# Patient Record
Sex: MALE | Race: WHITE | NOT HISPANIC OR LATINO | URBAN - METROPOLITAN AREA
[De-identification: names, ages, dates, MRNs, and addresses within clinical notes are randomized per-mention and may not be internally consistent; named-entity substitution may affect disease eponyms.]

---

## 2020-10-08 ENCOUNTER — EMERGENCY (EMERGENCY)
Facility: HOSPITAL | Age: 60
LOS: 1 days | Discharge: HOME | End: 2020-10-08
Attending: EMERGENCY MEDICINE | Admitting: EMERGENCY MEDICINE
Payer: COMMERCIAL

## 2020-10-08 VITALS
HEART RATE: 61 BPM | RESPIRATION RATE: 17 BRPM | TEMPERATURE: 98 F | SYSTOLIC BLOOD PRESSURE: 149 MMHG | DIASTOLIC BLOOD PRESSURE: 82 MMHG | OXYGEN SATURATION: 98 %

## 2020-10-08 VITALS
OXYGEN SATURATION: 97 % | TEMPERATURE: 98 F | WEIGHT: 179.9 LBS | SYSTOLIC BLOOD PRESSURE: 124 MMHG | RESPIRATION RATE: 17 BRPM | HEART RATE: 80 BPM | HEIGHT: 68 IN | DIASTOLIC BLOOD PRESSURE: 82 MMHG

## 2020-10-08 LAB — COHGB MFR BLDV: 2.6 % — HIGH

## 2020-10-08 PROCEDURE — 99284 EMERGENCY DEPT VISIT MOD MDM: CPT

## 2020-10-08 NOTE — ED PROVIDER NOTE - PHYSICAL EXAMINATION
Physical Exam  GEN: Awake, alert, non-toxic appearing, NCAT  EYES: full EOMI,  ENT: External inspection normal, normal voice,   HEAD: atraumatic  NECK: FROM neck, supple, no meningismus, trachea midline, no JVD  CV: rrr,   RESP: cta bl, no tachypnea, no hypoxia, no resp distress,  GI: +BS, Soft, nontender, no guarding/rebound,   MSK: FROM all 4 extremities,   SKIN: Color normal for race, warm and dry, no rash  NEURO: Oriented x3, CN 2-12 grossly intact, normal motor, strength equal bl, david x 4, steady gait, clear speech, Physical Exam  GEN: Awake, alert, non-toxic appearing, NCAT  EYES: full EOMI,  ENT: External inspection normal, normal voice, no soot, no pharyngeal edema  HEAD: atraumatic  NECK: FROM neck, supple, no meningismus, trachea midline, no JVD  CV: rrr,   RESP: cta bl, no tachypnea, no hypoxia, no resp distress,  GI: +BS, Soft, nontender, no guarding/rebound,   MSK: FROM all 4 extremities,   SKIN: Color normal for race, warm and dry, no rash  NEURO: Oriented x3, CN 2-12 grossly intact, normal motor, strength equal bl, david x 4, steady gait, clear speech,

## 2020-10-08 NOTE — ED ADULT NURSE NOTE - OBJECTIVE STATEMENT
Pt presents to ED c/o dizziness and headache s/p carbon monoxide alarm going off in apartment. Pt thinks he may have been exposed from home stove. Pt denies any CP/SOB, no N/V/D, no fever/chills.

## 2020-10-08 NOTE — ED PROVIDER NOTE - CLINICAL SUMMARY MEDICAL DECISION MAKING FREE TEXT BOX
potential co exposure, HA and LH but nonfocal neuro exam, no cp/sob, nontoxic appearing, will check carboxyhemoglobin, NRB O2, reassess

## 2020-10-08 NOTE — ED PROVIDER NOTE - OBJECTIVE STATEMENT
60 yom pw possible co exposure.  pt states he realized the stove was on, possibly for a "couple of hours".  c/o LH and HA.  no cp/sob/blurry vision/unsteadiness/abd pain/nv.  denies any sx prior. 60 yom pw possible co exposure.  pt states he realized the stove was on, possibly for a "couple of hours".  c/o LH and HA.  no cp/sob/blurry vision/unsteadiness/abd pain/nv.  denies any sx prior.  no smoke inhalation.

## 2020-10-08 NOTE — ED ADULT NURSE NOTE - CHPI ED NUR SYMPTOMS NEG
no confusion/no blurred vision/no loss of consciousness/no weakness/no change in level of consciousness/no numbness/no vomiting/no fever/no nausea

## 2020-10-08 NOTE — ED PROVIDER NOTE - PATIENT PORTAL LINK FT
You can access the FollowMyHealth Patient Portal offered by [facility] by registering at the following website: [email or web address]. By joining [employer]’s FollowMyHealth portal, you will also be able to view your health information using other applications (apps) compatible with our system.

## 2020-10-08 NOTE — ED ADULT TRIAGE NOTE - CHIEF COMPLAINT QUOTE
Pt reports carbon monoxide exposure from stove, home detector alarmed this evening. Pt reports feeling lightheaded and dizziness.

## 2020-10-08 NOTE — ED PROVIDER NOTE - NSFOLLOWUPINSTRUCTIONS_ED_ALL_ED_FT
Follow up with your primary care doctor   Return immediately for any new or worsening symptoms or any new concerns

## 2020-10-11 DIAGNOSIS — R51.9 HEADACHE, UNSPECIFIED: ICD-10-CM

## 2020-10-11 DIAGNOSIS — T58.11XA TOXIC EFFECT OF CARBON MONOXIDE FROM UTILITY GAS, ACCIDENTAL (UNINTENTIONAL), INITIAL ENCOUNTER: ICD-10-CM

## 2020-10-11 DIAGNOSIS — R42 DIZZINESS AND GIDDINESS: ICD-10-CM

## 2021-07-13 PROBLEM — Z00.00 ENCOUNTER FOR PREVENTIVE HEALTH EXAMINATION: Status: ACTIVE | Noted: 2021-07-13

## 2021-08-03 ENCOUNTER — APPOINTMENT (OUTPATIENT)
Dept: ORTHOPEDIC SURGERY | Facility: CLINIC | Age: 61
End: 2021-08-03
Payer: COMMERCIAL

## 2021-08-03 VITALS — BODY MASS INDEX: 24.44 KG/M2 | WEIGHT: 165 LBS | HEIGHT: 69 IN

## 2021-08-03 DIAGNOSIS — Z60.2 PROBLEMS RELATED TO LIVING ALONE: ICD-10-CM

## 2021-08-03 DIAGNOSIS — Z78.9 OTHER SPECIFIED HEALTH STATUS: ICD-10-CM

## 2021-08-03 DIAGNOSIS — Z86.39 PERSONAL HISTORY OF OTHER ENDOCRINE, NUTRITIONAL AND METABOLIC DISEASE: ICD-10-CM

## 2021-08-03 DIAGNOSIS — Z85.828 PERSONAL HISTORY OF OTHER MALIGNANT NEOPLASM OF SKIN: ICD-10-CM

## 2021-08-03 DIAGNOSIS — Z87.09 PERSONAL HISTORY OF OTHER DISEASES OF THE RESPIRATORY SYSTEM: ICD-10-CM

## 2021-08-03 PROCEDURE — 73030 X-RAY EXAM OF SHOULDER: CPT | Mod: RT

## 2021-08-03 PROCEDURE — 73070 X-RAY EXAM OF ELBOW: CPT | Mod: RT

## 2021-08-03 PROCEDURE — 99204 OFFICE O/P NEW MOD 45 MIN: CPT

## 2021-08-03 RX ORDER — CELECOXIB 200 MG/1
200 CAPSULE ORAL
Qty: 20 | Refills: 0 | Status: ACTIVE | COMMUNITY
Start: 2021-08-03 | End: 1900-01-01

## 2021-08-03 RX ORDER — OMEPRAZOLE 20 MG/1
20 CAPSULE, DELAYED RELEASE ORAL
Qty: 30 | Refills: 0 | Status: ACTIVE | COMMUNITY
Start: 2021-05-25

## 2021-08-03 SDOH — SOCIAL STABILITY - SOCIAL INSECURITY: PROBLEMS RELATED TO LIVING ALONE: Z60.2

## 2021-08-03 NOTE — PHYSICAL EXAM
[UE] : Sensory: Intact in bilateral upper extremities [Rad] : radial 2+ and symmetric bilaterally [Normal RUE] : Right Upper Extremity: No scars, rashes, lesions, ulcers, skin intact [Normal LUE] : Left Upper Extremity: No scars, rashes, lesions, ulcers, skin intact [Normal Touch] : sensation intact for touch [Normal] : Oriented to person, place, and time, insight and judgement were intact and the affect was normal [Pain Left Shoulder Active Forw Flexion Against Resistance] : positive Empty Can test [Shoulder Pain During Carlson-Baldo Impingement Test Left] : positive Carlson test [Shoulder Pain Elicited During Neer Impingement Test Left] : positive Neer test [Shoulder Pain Elicited During [place]'s Test Left] : positive Corbett's test [Tender On Palpation Bicipital Groove Speed's Test Left] : positive Speed's test [Elbow Instability Laxity Left Medial] : negative Valgus Stress test [Elbow Instability Laxity Left Lateral] : negative Varus stress test [Shoulder Instability] : negative Anterior-Posterior Slide [Shoulder Instab Anterior Apprehension (Crank) Test Left] : negative Crank test [Shoulder Muscle Weakness Supraspinatus Left Only] : negative Drop Arm test [Active Abduction Left Shoulder Decreased] : negative Painful Arc [FreeTextEntry2] : Shoulder range of motion is with 170 to 175 degrees elevation without any significant pain. Internal rotation to T9. External rotation to about 75 degrees. There is about 5 degrees less motion on the right than left shoulder. No significant pain with range of motion.\par Mild pain with Neer and Carlson but no severe pain. Mild pain with Braxton's.\par No tenderness around the shoulder.\par No scapular winging. No deformity. [de-identified] : \par X-rays of the right shoulder taken today AP, Y lateral and axillary views are unremarkable. There may be mild AC joint narrowing. No evidence of arthritis and no calcifications. Glenohumeral joint is aligned normally.\par \par X-rays of the right elbow AP and lateral views are unremarkable

## 2021-08-03 NOTE — HISTORY OF PRESENT ILLNESS
[de-identified] : Mr Brennan is a 60 yo RHD  who presents for pain in his right shoulder and elbow. \par Shoulder pain started several years ago. He had done some therapy a few years ago. He feels like there is limited mobility in his shoulder on the right compared to the left that affects his tennis particularly overhead activity like serving. Several weeks ago he developed some elbow pain. He thought it might be related to the way he is straining the elbow and is served but then also had hit a wall with the palm of his hand in the wall was harder than what he thought and this caused some elbow pain.\par He does not take any medication for his pains.

## 2021-08-03 NOTE — REVIEW OF SYSTEMS
[Joint Pain] : joint pain [Joint Stiffness] : joint stiffness [Negative] : Heme/Lymph [Heartburn] : heartburn [Urinary Frequency] : urinary frequency

## 2021-08-03 NOTE — ASSESSMENT
[FreeTextEntry1] : 61-year-old gentleman right-hand-dominant  who comes in with some chronic right elbow pain and mild stiffness and more recent right elbow pain.\par On exam his shoulder is some mild loss of motion and there is some evidence of rotator cuff tendinopathy. Elbow exam is most consistent with lateral epicondylitis although there is also some mild medial sided tenderness. No evidence of any fractures given his recent trauma hitting and hard object.\par For both I recommended first starting with relative rest backing off on the tennis to avoid aggravating the condition and to start physical therapy. He was given home exercises as well. Heat and ice. For a few weeks he will take 1 Celebrex a day as an anti-inflammatory to see if this helps.\par He should follow-up in about 8 weeks. If its not getting better then we may need to work-up further with other imaging.\par If he is getting better than it would be good to get on a long-term strengthening and conditioning program to help him play tennis continually without with less risk of injury.

## 2021-08-03 NOTE — REASON FOR VISIT
[Initial Visit] : an initial visit for [Shoulder Pain] : shoulder pain [FreeTextEntry2] : elbow pain

## 2021-11-17 ENCOUNTER — APPOINTMENT (OUTPATIENT)
Dept: ORTHOPEDIC SURGERY | Facility: CLINIC | Age: 61
End: 2021-11-17

## 2022-02-03 NOTE — HISTORY OF PRESENT ILLNESS
[de-identified] : Mr Brennan is a 62 yo RHD  comes in for f/u for pain in his right shoulder and elbow. He was seen 6 mos ago.\par Shoulder pain

## 2022-02-03 NOTE — ASSESSMENT
[FreeTextEntry1] : 61-year-old gentleman right-hand-dominant  who comes in with some chronic right elbow pain and mild stiffness and more recent right elbow pain.\par On exam his shoulder is some mild loss of motion and there is some evidence of rotator cuff tendinopathy. Elbow exam is most consistent with lateral epicondylitis

## 2022-02-03 NOTE — PHYSICAL EXAM
[de-identified] : Shoulder [de-identified] : \par X-rays of the right shoulder taken 8/3/21 AP, Y lateral and axillary views were unremarkable. There may be mild AC joint narrowing. No evidence of arthritis and no calcifications. Glenohumeral joint is aligned normally.\par \par X-rays of the right elbow AP and lateral views were unremarkable

## 2022-02-04 ENCOUNTER — APPOINTMENT (OUTPATIENT)
Dept: ORTHOPEDIC SURGERY | Facility: CLINIC | Age: 62
End: 2022-02-04

## 2022-02-16 ENCOUNTER — APPOINTMENT (OUTPATIENT)
Dept: ORTHOPEDIC SURGERY | Facility: CLINIC | Age: 62
End: 2022-02-16
Payer: COMMERCIAL

## 2022-02-16 PROCEDURE — 99214 OFFICE O/P EST MOD 30 MIN: CPT

## 2022-02-16 NOTE — HISTORY OF PRESENT ILLNESS
[de-identified] : Mr Brennan is a 60 yo RHD  comes in for f/u for pain in his right shoulder and elbow. \par He has done some PT. It took awhile to get started\par He hasn't been playing as much tennis so isn't sure it hurts as much.\par Occasionally he feels a pain reaching in bed. Pains are brief.\par He feels it with a hard serve.\par He hasn't taken anything for pain.\par He is not doing exercises as consistently on his own.

## 2022-02-16 NOTE — ASSESSMENT
[FreeTextEntry1] : 61-year-old gentleman right-hand-dominant  who comes in with chronic right shoulder pain most consistent with rotator cuff tendinopathy and may have a labral tear/SLAP tear given the pain in the biceps.\par He has done some physical therapy over the last 4 to 5 months with limited improvement.  He still feeling the pain.  I recommended getting an MRI at this point to evaluate further.  He will continue with exercises and therapy.  I reviewed some exercises he should and should not do.  I also recommended doing more generalized strengthening in the core and back and lower extremities.\par He has to modify mechanics.  He has some stiffness generally in the shoulders with reaching overhead so serving may put more stress on his shoulder than someone who has increased natural motion.\par If he still not getting better an MRI shows any tears then surgery may be considered.  Injection may be considered as well if there are not any tears of the rotator cuff.

## 2022-02-16 NOTE — PHYSICAL EXAM
[UE] : Sensory: Intact in bilateral upper extremities [Rad] : radial 2+ and symmetric bilaterally [Normal RUE] : Right Upper Extremity: No scars, rashes, lesions, ulcers, skin intact [Normal LUE] : Left Upper Extremity: No scars, rashes, lesions, ulcers, skin intact [Normal Touch] : sensation intact for touch [Normal] : Oriented to person, place, and time, insight and judgement were intact and the affect was normal [de-identified] : Right shoulder with left for comparison\par No edema, ecchymoses, erythema.\par Tender biceps in the groove.\par Shoulder range of motion similar bilaterally with about 170 to 175 degrees forward elevation.  No drop arm or painful arc but mild discomfort.\par Internal rotation is to T11 on the right versus T8 on the left.\par With the arm at the side there is about 65 degrees external rotation.\par In 90 degrees abduction there is 75 degrees external rotation and 20 degrees internal rotation.\par Mild pain with Neer and Carlson.  Positive speeds.\par 5/5 supraspinatus, internal rotation and external rotation and biceps.\par Normal neurovascular exam [de-identified] : \par X-rays of the right shoulder taken 8/3/21 AP, Y lateral and axillary views were unremarkable. There may be mild AC joint narrowing. No evidence of arthritis and no calcifications. Glenohumeral joint is aligned normally.\par X-rays of the right elbow AP and lateral views were unremarkable

## 2022-04-13 ENCOUNTER — APPOINTMENT (OUTPATIENT)
Dept: ORTHOPEDIC SURGERY | Facility: CLINIC | Age: 62
End: 2022-04-13
Payer: COMMERCIAL

## 2022-05-05 ENCOUNTER — APPOINTMENT (OUTPATIENT)
Dept: ORTHOPEDIC SURGERY | Facility: CLINIC | Age: 62
End: 2022-05-05
Payer: COMMERCIAL

## 2022-05-10 ENCOUNTER — OUTPATIENT (OUTPATIENT)
Dept: OUTPATIENT SERVICES | Facility: HOSPITAL | Age: 62
LOS: 1 days | End: 2022-05-10

## 2022-05-10 ENCOUNTER — RESULT REVIEW (OUTPATIENT)
Age: 62
End: 2022-05-10

## 2022-05-10 ENCOUNTER — APPOINTMENT (OUTPATIENT)
Dept: MRI IMAGING | Facility: CLINIC | Age: 62
End: 2022-05-10
Payer: COMMERCIAL

## 2022-05-10 PROCEDURE — 73221 MRI JOINT UPR EXTREM W/O DYE: CPT | Mod: 26,RT

## 2022-05-11 PROBLEM — M67.911 TENDINOPATHY OF RIGHT ROTATOR CUFF: Status: ACTIVE | Noted: 2021-08-03

## 2022-05-11 PROBLEM — M25.511 RIGHT SHOULDER PAIN: Status: ACTIVE | Noted: 2021-08-03

## 2022-05-12 ENCOUNTER — APPOINTMENT (OUTPATIENT)
Dept: ORTHOPEDIC SURGERY | Facility: CLINIC | Age: 62
End: 2022-05-12
Payer: COMMERCIAL

## 2022-05-12 DIAGNOSIS — M67.921 UNSPECIFIED DISORDER OF SYNOVIUM AND TENDON, RIGHT UPPER ARM: ICD-10-CM

## 2022-05-12 DIAGNOSIS — M67.911 UNSPECIFIED DISORDER OF SYNOVIUM AND TENDON, RIGHT SHOULDER: ICD-10-CM

## 2022-05-12 DIAGNOSIS — M25.511 PAIN IN RIGHT SHOULDER: ICD-10-CM

## 2022-05-12 PROCEDURE — 99214 OFFICE O/P EST MOD 30 MIN: CPT

## 2022-05-12 NOTE — HISTORY OF PRESENT ILLNESS
[de-identified] : Jaime comes in for follow up on his RIGHT shoulder. \par He had an MRI on 5/10/22 that showed mild to moderate RC tendinopathy present with mild intrasubstance tearing of the conjoined tendon of the supraspinatus and infraspinatus. \par He was referred for more PT last visit and he\par Pain is  2-3/10 aggravated by certain movements.  It hurts reaching above his head and behind and out to the side.\par He is taking nothing for pain.  I had prescribed Celebrex last August which he never really took very much of.  He prefers not to take medication.

## 2022-05-12 NOTE — PHYSICAL EXAM
[Rad] : radial 2+ and symmetric bilaterally [de-identified] : Right shoulder with left for comparison\par No edema, ecchymoses, erythema.\par Tender biceps in the groove   Slightly more right than left\par Shoulder range of motion similar bilaterally with about 170 to 175 degrees forward elevation.  No drop arm or painful arc but mild discomfort.\par Internal rotation is to T11 on the right versus T8 on the left.\par With the arm at the side there is about 65 degrees external rotation.\par In 90 degrees abduction there is 75 degrees external rotation and 30 degrees internal rotation.\par Mild pain with Neer and Carlson.  Mild pain with speeds.\par 5/5 supraspinatus, internal rotation and external rotation and biceps.\par Normal neurovascular exam [de-identified] : \par MRI of the right shoulder on May 10, 2022 shows rotator cuff tendinopathy and interstitial partial tearing at the supraspinatus, infraspinatus insertion but no high-grade tears.  I question if there is likely some biceps tendinopathy as well not reported on.\par \par X-rays of RIGHT shoulder taken 8/3/21 AP, Y lateral, axillary views were unremarkable. There may be mild AC joint narrowing. No evidence of arthritis and no calcifications. GH joint is aligned normally.\par \par X-rays of the RIGHT elbow AP and lateral views were unremarkable.\par

## 2022-05-12 NOTE — ASSESSMENT
[FreeTextEntry1] : 61-year-old gentleman right-hand-dominant  who comes in with chronic right shoulder pain consistent with rotator cuff tendinopathy and the MRI showed a small interstitial rotator cuff tear.  I question biceps tendinopathy as well not reported on. No indication for surgery at this time.\par He should take the Celebrex for 2 wks and continue shoulder exercises.\par If he still is not getting enough relief of pain then neck step would be to try an injection either subacromial or in the biceps tendon sheath.\par He should modify activities to avoid aggravating pain if possible.  Follow-up 1 to 2 months depending on how he does with the medication and more therapy and exercises.\par I did explain to them that a very small tear could progress over time and get bigger or degeneration get greater as he gets older.\par

## 2022-08-01 ENCOUNTER — APPOINTMENT (OUTPATIENT)
Dept: ORTHOPEDIC SURGERY | Facility: CLINIC | Age: 62
End: 2022-08-01

## 2022-08-01 DIAGNOSIS — M77.02 MEDIAL EPICONDYLITIS, LEFT ELBOW: ICD-10-CM

## 2022-08-01 DIAGNOSIS — S46.911A STRAIN OF UNSPECIFIED MUSCLE, FASCIA AND TENDON AT SHOULDER AND UPPER ARM LEVEL, RIGHT ARM, INITIAL ENCOUNTER: ICD-10-CM

## 2022-08-01 DIAGNOSIS — M77.11 LATERAL EPICONDYLITIS, RIGHT ELBOW: ICD-10-CM

## 2022-08-01 PROCEDURE — 99214 OFFICE O/P EST MOD 30 MIN: CPT

## 2022-08-01 PROCEDURE — 73080 X-RAY EXAM OF ELBOW: CPT | Mod: LT

## 2022-08-01 NOTE — ASSESSMENT
[FreeTextEntry1] : 63 y/o RHD male with LEFT elbow pain for 1 month following surfing injury.\par He appears to have medial epicondylitis.  There may have been a strain to the medial elbow common flexor pronator origin.  His elbow feels stable on exam.\par Recommended trying some physical therapy over the next month.  Heat and ice as needed.\par He can take an anti-inflammatory.  If they can also work on his right elbow where he has some mild residual but improving lateral epicondylitis.\par Follow-up in 6 to 8 weeks if it is not better sooner if the therapy is aggravating the left elbow in which case we can get an MRI and evaluate further.  I did not feel an MRI was urgent at this time given good motion and function.  A partial sprain of the ulnar collateral ligament would not be treated emergently with surgery and likely would treat with therapy and more time particularly given his age and it is his nondominant arm.

## 2022-08-01 NOTE — PHYSICAL EXAM
[UE] : Sensory: Intact in bilateral upper extremities [Normal RUE] : Right Upper Extremity: No scars, rashes, lesions, ulcers, skin intact [Normal LUE] : Left Upper Extremity: No scars, rashes, lesions, ulcers, skin intact [Normal Touch] : sensation intact for touch [Normal] : Oriented to person, place, and time, insight and judgement were intact and the affect was normal [Rad] : radial 2+ and symmetric bilaterally [de-identified] : Bilateral elbows\par No edema, ecchymoses, erythema, deformity.\par Tender left elbow medial epicondyle and flexor pronator origin.  Minimally tender right elbow lateral epicondyles and common extensor origin.\par No pain with resisted middle finger extension.\par Full range of motion 0 to 150 degrees flexion and 90 degrees pronation supination bilateral elbows without crepitus.\par 5/5 biceps, triceps, resisted middle finger extension, .\par Sensations intact distally.\par Negative Tinel's ulnar nerve/cubital tunnel.\par No pain or appreciable instability with valgus stress.\par \par Right shoulder with left for comparison\par No edema, ecchymoses, erythema.\par Tender biceps in the groove   Slightly more right than left\par Shoulder range of motion similar bilaterally with about 170 to 175 degrees forward elevation.  No drop arm or painful arc but mild discomfort.\par Internal rotation is to T11 on the right versus T8 on the left.\par With the arm at the side there is about 65 degrees external rotation.\par In 90 degrees abduction there is 75 degrees external rotation and 30 degrees internal rotation.\par Mild pain with Neer and Carlson.  Mild pain with speeds.\par 5/5 supraspinatus, internal rotation and external rotation and biceps.\par Normal neurovascular exam [de-identified] : \par X-rays of left elbow AP and lateral and oblique views today showed no fractures or abnormalities\par \par X-rays of RIGHT shoulder taken 8/3/21 AP, Y lateral, axillary views were unremarkable. There may be mild AC joint narrowing. No evidence of arthritis and no calcifications. GH joint is aligned normally.\par \par X-rays of the RIGHT elbow taken 8/3/21 AP and lateral views were unremarkable.\par

## 2022-08-01 NOTE — HISTORY OF PRESENT ILLNESS
[de-identified] : Mr. Brennan is a 61 y/o RHD male who comes in for evaluation for  LEFT elbow pain that started about a month ago when he was taking a surfing lesson as a beginner and fell and he thinks he hit his elbow or something happened as he fell off the board injuring the left elbow.  He did not notice bruising or swelling but did not pay much attention.  Its been hurting since then.\par He had done physical therapy for the right shoulder.  His right elbow pain that he was having has improved but not gone away completely.  This was diagnosed last year.  He does play tennis.  That really has not been an issue for the left elbow.  He states that the left elbow seems to hurt the most when he is sleeping at night and the arm is leaning out to the side with a slight valgus stress perhaps to the elbow.\par

## 2023-06-29 ENCOUNTER — EMERGENCY (EMERGENCY)
Facility: HOSPITAL | Age: 63
LOS: 1 days | Discharge: HOME | End: 2023-06-29
Attending: EMERGENCY MEDICINE | Admitting: EMERGENCY MEDICINE
Payer: COMMERCIAL

## 2023-06-29 VITALS
DIASTOLIC BLOOD PRESSURE: 77 MMHG | HEIGHT: 69 IN | OXYGEN SATURATION: 100 % | TEMPERATURE: 97 F | RESPIRATION RATE: 18 BRPM | WEIGHT: 164.91 LBS | HEART RATE: 96 BPM | SYSTOLIC BLOOD PRESSURE: 107 MMHG

## 2023-06-29 DIAGNOSIS — T17.208A UNSPECIFIED FOREIGN BODY IN PHARYNX CAUSING OTHER INJURY, INITIAL ENCOUNTER: ICD-10-CM

## 2023-06-29 DIAGNOSIS — T17.228A FOOD IN PHARYNX CAUSING OTHER INJURY, INITIAL ENCOUNTER: ICD-10-CM
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
## 2023-06-29 PROCEDURE — 70490 CT SOFT TISSUE NECK W/O DYE: CPT | Mod: 26

## 2023-06-29 PROCEDURE — 99284 EMERGENCY DEPT VISIT MOD MDM: CPT

## 2023-06-29 NOTE — ED ADULT TRIAGE NOTE - CHIEF COMPLAINT QUOTE
Walk in pt with complaints of foreign body throat since last night. States it's a fishbone. No difficulty breathing. States slightly uncomfortable to swallow but he able to clear secretions.

## 2023-06-30 ENCOUNTER — APPOINTMENT (OUTPATIENT)
Dept: OTOLARYNGOLOGY | Facility: CLINIC | Age: 63
End: 2023-06-30
Payer: COMMERCIAL

## 2023-06-30 ENCOUNTER — EMERGENCY (EMERGENCY)
Facility: HOSPITAL | Age: 63
LOS: 1 days | Discharge: HOME | End: 2023-06-30
Attending: STUDENT IN AN ORGANIZED HEALTH CARE EDUCATION/TRAINING PROGRAM | Admitting: STUDENT IN AN ORGANIZED HEALTH CARE EDUCATION/TRAINING PROGRAM
Payer: COMMERCIAL

## 2023-06-30 VITALS
SYSTOLIC BLOOD PRESSURE: 137 MMHG | TEMPERATURE: 98 F | OXYGEN SATURATION: 97 % | RESPIRATION RATE: 18 BRPM | HEART RATE: 55 BPM | DIASTOLIC BLOOD PRESSURE: 78 MMHG

## 2023-06-30 VITALS
SYSTOLIC BLOOD PRESSURE: 121 MMHG | HEART RATE: 103 BPM | HEIGHT: 69 IN | TEMPERATURE: 97 F | OXYGEN SATURATION: 98 % | RESPIRATION RATE: 17 BRPM | WEIGHT: 164.91 LBS | DIASTOLIC BLOOD PRESSURE: 76 MMHG

## 2023-06-30 DIAGNOSIS — R13.10 DYSPHAGIA, UNSPECIFIED: ICD-10-CM

## 2023-06-30 DIAGNOSIS — T17.208A UNSPECIFIED FOREIGN BODY IN PHARYNX CAUSING OTHER INJURY, INITIAL ENCOUNTER: ICD-10-CM

## 2023-06-30 DIAGNOSIS — T17.220A FOOD IN PHARYNX CAUSING ASPHYXIATION, INITIAL ENCOUNTER: ICD-10-CM

## 2023-06-30 DIAGNOSIS — J02.9 ACUTE PHARYNGITIS, UNSPECIFIED: ICD-10-CM

## 2023-06-30 LAB
ANION GAP SERPL CALC-SCNC: 7 MMOL/L — SIGNIFICANT CHANGE UP (ref 5–17)
APTT BLD: 32.2 SEC — SIGNIFICANT CHANGE UP (ref 27.5–35.5)
BASOPHILS # BLD AUTO: 0.08 K/UL — SIGNIFICANT CHANGE UP (ref 0–0.2)
BASOPHILS NFR BLD AUTO: 1.4 % — SIGNIFICANT CHANGE UP (ref 0–2)
BLD GP AB SCN SERPL QL: NEGATIVE — SIGNIFICANT CHANGE UP
BUN SERPL-MCNC: 12 MG/DL — SIGNIFICANT CHANGE UP (ref 7–23)
CALCIUM SERPL-MCNC: 9.8 MG/DL — SIGNIFICANT CHANGE UP (ref 8.4–10.5)
CHLORIDE SERPL-SCNC: 102 MMOL/L — SIGNIFICANT CHANGE UP (ref 96–108)
CO2 SERPL-SCNC: 29 MMOL/L — SIGNIFICANT CHANGE UP (ref 22–31)
CREAT SERPL-MCNC: 1.07 MG/DL — SIGNIFICANT CHANGE UP (ref 0.5–1.3)
EGFR: 78 ML/MIN/1.73M2 — SIGNIFICANT CHANGE UP
EOSINOPHIL # BLD AUTO: 0.64 K/UL — HIGH (ref 0–0.5)
EOSINOPHIL NFR BLD AUTO: 11.4 % — HIGH (ref 0–6)
GLUCOSE SERPL-MCNC: 101 MG/DL — HIGH (ref 70–99)
HCT VFR BLD CALC: 41.3 % — SIGNIFICANT CHANGE UP (ref 39–50)
HGB BLD-MCNC: 13.9 G/DL — SIGNIFICANT CHANGE UP (ref 13–17)
IMM GRANULOCYTES NFR BLD AUTO: 0.2 % — SIGNIFICANT CHANGE UP (ref 0–0.9)
INR BLD: 1.05 — SIGNIFICANT CHANGE UP (ref 0.88–1.16)
LYMPHOCYTES # BLD AUTO: 1.17 K/UL — SIGNIFICANT CHANGE UP (ref 1–3.3)
LYMPHOCYTES # BLD AUTO: 20.9 % — SIGNIFICANT CHANGE UP (ref 13–44)
MCHC RBC-ENTMCNC: 31 PG — SIGNIFICANT CHANGE UP (ref 27–34)
MCHC RBC-ENTMCNC: 33.7 GM/DL — SIGNIFICANT CHANGE UP (ref 32–36)
MCV RBC AUTO: 92.2 FL — SIGNIFICANT CHANGE UP (ref 80–100)
MONOCYTES # BLD AUTO: 0.84 K/UL — SIGNIFICANT CHANGE UP (ref 0–0.9)
MONOCYTES NFR BLD AUTO: 15 % — HIGH (ref 2–14)
NEUTROPHILS # BLD AUTO: 2.86 K/UL — SIGNIFICANT CHANGE UP (ref 1.8–7.4)
NEUTROPHILS NFR BLD AUTO: 51.1 % — SIGNIFICANT CHANGE UP (ref 43–77)
NRBC # BLD: 0 /100 WBCS — SIGNIFICANT CHANGE UP (ref 0–0)
PLATELET # BLD AUTO: 328 K/UL — SIGNIFICANT CHANGE UP (ref 150–400)
POTASSIUM SERPL-MCNC: 4.6 MMOL/L — SIGNIFICANT CHANGE UP (ref 3.5–5.3)
POTASSIUM SERPL-SCNC: 4.6 MMOL/L — SIGNIFICANT CHANGE UP (ref 3.5–5.3)
PROTHROM AB SERPL-ACNC: 12.5 SEC — SIGNIFICANT CHANGE UP (ref 10.5–13.4)
RBC # BLD: 4.48 M/UL — SIGNIFICANT CHANGE UP (ref 4.2–5.8)
RBC # FLD: 12.3 % — SIGNIFICANT CHANGE UP (ref 10.3–14.5)
RH IG SCN BLD-IMP: POSITIVE — SIGNIFICANT CHANGE UP
SODIUM SERPL-SCNC: 138 MMOL/L — SIGNIFICANT CHANGE UP (ref 135–145)
WBC # BLD: 5.6 K/UL — SIGNIFICANT CHANGE UP (ref 3.8–10.5)
WBC # FLD AUTO: 5.6 K/UL — SIGNIFICANT CHANGE UP (ref 3.8–10.5)

## 2023-06-30 PROCEDURE — 85025 COMPLETE CBC W/AUTO DIFF WBC: CPT

## 2023-06-30 PROCEDURE — 85730 THROMBOPLASTIN TIME PARTIAL: CPT

## 2023-06-30 PROCEDURE — 86901 BLOOD TYPING SEROLOGIC RH(D): CPT

## 2023-06-30 PROCEDURE — 99284 EMERGENCY DEPT VISIT MOD MDM: CPT

## 2023-06-30 PROCEDURE — 99283 EMERGENCY DEPT VISIT LOW MDM: CPT

## 2023-06-30 PROCEDURE — 31575 DIAGNOSTIC LARYNGOSCOPY: CPT

## 2023-06-30 PROCEDURE — 80048 BASIC METABOLIC PNL TOTAL CA: CPT

## 2023-06-30 PROCEDURE — 86900 BLOOD TYPING SEROLOGIC ABO: CPT

## 2023-06-30 PROCEDURE — 86850 RBC ANTIBODY SCREEN: CPT

## 2023-06-30 PROCEDURE — 36415 COLL VENOUS BLD VENIPUNCTURE: CPT

## 2023-06-30 PROCEDURE — 85610 PROTHROMBIN TIME: CPT

## 2023-06-30 PROCEDURE — 99203 OFFICE O/P NEW LOW 30 MIN: CPT | Mod: 25

## 2023-06-30 RX ORDER — LIDOCAINE 4 G/100G
4 CREAM TOPICAL ONCE
Refills: 0 | Status: COMPLETED | OUTPATIENT
Start: 2023-06-30 | End: 2023-06-30

## 2023-06-30 RX ORDER — LIDOCAINE 4 G/100G
5 CREAM TOPICAL ONCE
Refills: 0 | Status: COMPLETED | OUTPATIENT
Start: 2023-06-30 | End: 2023-06-30

## 2023-06-30 RX ORDER — TETRACAINE/BENZOCAINE/BUTAMBEN 2%-14%-2%
1 OINTMENT (GRAM) TOPICAL ONCE
Refills: 0 | Status: COMPLETED | OUTPATIENT
Start: 2023-06-30 | End: 2023-06-30

## 2023-06-30 RX ORDER — LIDOCAINE HCL 20 MG/ML
8 VIAL (ML) INJECTION ONCE
Refills: 0 | Status: DISCONTINUED | OUTPATIENT
Start: 2023-06-30 | End: 2023-06-30

## 2023-06-30 RX ORDER — LIDOCAINE HCL 20 MG/ML
4 VIAL (ML) INJECTION ONCE
Refills: 0 | Status: COMPLETED | OUTPATIENT
Start: 2023-06-30 | End: 2023-06-30

## 2023-06-30 RX ADMIN — LIDOCAINE 5 MILLILITER(S): 4 CREAM TOPICAL at 11:29

## 2023-06-30 RX ADMIN — Medication 1 SPRAY(S): at 11:29

## 2023-06-30 NOTE — ED ADULT NURSE NOTE - CHIEF COMPLAINT QUOTE
on unit Pt c/o mild throat pain x wednesday. Pt seen by ENT this morning, sent for admissino for surgery with ZAHRA Cifuentes for "fishbone lodged submucosally in L lingual tonsil". PMH GERD. Pt denies sob, drooling, voice changes. Able to tolerate PO intake.

## 2023-06-30 NOTE — ED PROVIDER NOTE - PROGRESS NOTE DETAILS
DiscAttempted with glide scope to visualize foreign body and removed, patient was anesthetized with local Cetacaine spray with lidocaine.  Unable to visualize foreign body at location on CT scan.  I recommended patient be transferred for foreign body removal.  He declined, stating he feels okay swallowing and would rather do the procedure as an outpatient.  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  Consulted ENT Dr. Fraser. he recommended the patient follow-up with him first thing in the morning in the office.  I gave him Dr. Eller's office information including phone number and address, he verbalized understanding and agreed to follow-up first thing in the morning.  Tolerating p.o., stable for DC home.  Deep strict return precautions discussed in detail and patient appears reliable and will adhere to strict precautions.

## 2023-06-30 NOTE — ED PROVIDER NOTE - CLINICAL SUMMARY MEDICAL DECISION MAKING FREE TEXT BOX
64 yo M presenting with fishbone stuck in throat, tolerating secretions, not drooling, no swelling. ENT aware, requesting viscous lidocaine, will come see patient.

## 2023-06-30 NOTE — ED ADULT NURSE NOTE - OBJECTIVE STATEMENT
63 year old M patient, A+OX3, ambulatory w steady gait called in by ENT for admission for OR for bone lodged into L tonsil.  States he ate fish on wednesday, and noted throat pain since, no distress noted.  Tolerating secretions well, no drooling noted.

## 2023-06-30 NOTE — ED PROVIDER NOTE - CARE PROVIDER_API CALL
William Fraser  Otolaryngology  [address] [place] [address]  Phone: [phone]  Fax: [phone]  Follow Up Time: 4-6 Days

## 2023-06-30 NOTE — ED PROVIDER NOTE - PHYSICAL EXAMINATION
VSS in NAD non toxic appearing   NCAT EOMI PERRL OP clear No visible foreign body, no swelling, no bleeding  No crepitus of the neck, trachea midline  normal neuro exam CN I-XII grossly intact, no gross motor or sensory deficits  no peripheral c/c/e

## 2023-06-30 NOTE — ED PROVIDER NOTE - NS ED ROS FT
Constitutional: No fever or chills  Eyes: No discharge or drainage  Ears, Nose, Mouth, Throat: No nasal discharge, sore throat  Cardiovascular: No chest pain, no palpitations  Respiratory: No shortness of breath, no cough  Gastrointestinal: No nausea or vomiting, no abdominal pain, no diarrhea or constipation  Musculoskeletal: No joint pain, no swelling  Skin: No rashes or lesions  Neurological: No numbness, weakness, tingling, no headache

## 2023-06-30 NOTE — ED ADULT NURSE NOTE - NSSUHOSCREENINGYN_ED_ALL_ED
Yes - the patient is able to be screened Retinoid Dermatitis Aggressive Treatment: I recommended more frequent application of Cetaphil or CeraVe to the areas of dermatitis. I also prescribed a topical steroid for twice daily use until the dermatitis resolves.

## 2023-06-30 NOTE — ED PROVIDER NOTE - PATIENT PORTAL LINK FT
You can access the FollowMyHealth Patient Portal offered by [facility] by registering at the following website: [email or web address]. By joining [employer]’s FollowMyHealth portal, you will also be able to view your health information using other applications (apps) compatible with our system.

## 2023-06-30 NOTE — ED ADULT TRIAGE NOTE - HEIGHT IN INCHES
Contacted Radha.   Verbal authorization given for home care orders per [facility] [place].    9

## 2023-06-30 NOTE — HISTORY OF PRESENT ILLNESS
[de-identified] : AUTUMN SWEENEY is a 63 year man with a history of getting fishbone stuck in throat weds. He has persistent intermittent pain. He is able to eat with pain. CT at [facility] ER showed fishbone in left hypopharynx at level of hyoid none on the left.

## 2023-06-30 NOTE — ED PROVIDER NOTE - CARE PROVIDER_API CALL
Garcia Fraser  Otolaryngology  [address] [place] [address], [address]  [place] [address]  Phone: [phone]  Fax: [phone]  Follow Up Time: Urgent

## 2023-06-30 NOTE — ED PROVIDER NOTE - CLINICAL SUMMARY MEDICAL DECISION MAKING FREE TEXT BOX
Attempted with glide scope to visualize foreign body and removed, patient was anesthetized with local Cetacaine spray with lidocaine.  Unable to visualize foreign body at location on CT scan.  I recommended patient be transferred for foreign body removal.  He declined, stating he feels okay swallowing and would rather do the procedure as an outpatient.  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  Consulted ENT Dr. Fraser. he recommended the patient follow-up with him first thing in the morning in the office.  I gave him Dr. Eller's office information including phone number and address, he verbalized understanding and agreed to follow-up first thing in the morning.  Tolerating p.o., stable for DC home.  Deep strict return precautions discussed in detail and patient appears reliable and will adhere to strict precautions.

## 2023-06-30 NOTE — ED ADULT TRIAGE NOTE - CHIEF COMPLAINT QUOTE
Pt c/o mild throat pain x wednesday. Pt seen by ENT this morning, sent for admissino for surgery with ZAHRA Cifuentes for "fishbone lodged submucosally in L lingual tonsil". PMH GERD. Pt denies sob, drooling, voice changes. Able to tolerate PO intake.

## 2023-06-30 NOTE — ED PROVIDER NOTE - NSFOLLOWUPINSTRUCTIONS_ED_ALL_ED_FT
Please return for any worsening symptoms. Please follow up with the ENT doctor as needed.    I hope you feel better!    Sincerely,  Misti Vasquez MD

## 2023-06-30 NOTE — ED PROVIDER NOTE - PATIENT PORTAL LINK FT
You can access the FollowMyHealth Patient Portal offered by [facility] by registering at the following website: [email or web address]. By joining [facility]’s FollowMyHealth portal, you will also be able to view your health information using other applications (apps) compatible with our system.

## 2023-06-30 NOTE — ED ADULT NURSE NOTE - NSFALLUNIVINTERV_ED_ALL_ED
Bed/Stretcher in lowest position, wheels locked, appropriate side rails in place/Call bell, personal items and telephone in reach/Instruct patient to call for assistance before getting out of bed/chair/stretcher/Non-slip footwear applied when patient is off stretcher/[place] to call system/Physically safe environment - no spills, clutter or unnecessary equipment/Purposeful proactive rounding/Room/bathroom lighting operational, light cord in reach

## 2023-06-30 NOTE — ED PROVIDER NOTE - PHYSICAL EXAMINATION
general: Well appearing, in no acute distress  HEENT: Normocephalic, atraumatic, extraocular movements intact, no drooling, tolerating secretions, no hoarse voice  CV: Regular rate  Pulm: No respiratory distress, no tachypnea  Abd: Flat, no gross distension  Ext: warm and well perfused  Skin: No gross rashes or lesions  Neuro: Alert and oriented, moving all extremities

## 2023-06-30 NOTE — ASSESSMENT
[FreeTextEntry1] : AUTUMN SWEENEY has submucosal fishbone in left lingual tonsil. We made arrangements with ENT resident and patient will be admitted through the ER for DL and removal of bone.

## 2023-06-30 NOTE — ED PROVIDER NOTE - OBJECTIVE STATEMENT
64 yo m with no pertinent pmh sent from ENT office for fishbone stuck in throat. Pt had fishbone stuck in throat, was at [facility] yesterday, attempted to remove without success, had CT imaging. Pt saw Dr. Fraser today, attempted to remove in office but unsuccessful, told to come to ER for scope to remove. Some pain with swallowing, no drooling, no changes in voice, no swelling. No other acute complaints. ROS as above.

## 2023-07-01 NOTE — CONSULT NOTE ADULT - SUBJECTIVE AND OBJECTIVE BOX
HPI: 63y Male presenting to [facility] ED with piter. He was eating fish on Wednesday when he noticed a sharp sensation in his throat and felt like something had become stuck there. Since then, he has felt persistent throat discomfort when coughing or swallowing and feels like something is stuck in his throat. Removal was attempted in the [facility] ED two days ago, and in clinic at Dr. Fraser's office this morning but was unsuccessful. He denies any fevers at home, has had no neck pain. No difficulty breathing.    Allergies    No Known Allergies    Intolerances      Vital Signs Last 24 Hrs  T(C): --  T(F): --  HR: --  BP: --  BP(mean): --  RR: --  SpO2: --        LABS:  CBC-    06-30    138  |  102  |  12  ----------------------------<  101<H>  4.6   |  29  |  1.07    Ca    9.8      30 Jun 2023 10:26      Coagulation Studies-  PT/INR - ( 30 Jun 2023 10:26 )   PT: 12.5 sec;   INR: 1.05          PTT - ( 30 Jun 2023 10:26 )  PTT:32.2 sec  Endocrine Panel-  --  --  9.8 mg/dL        PHYSICAL EXAM:  ENT EXAM-   Constitutional: Well-developed, well-nourished.  No hoarseness.     Head:  normocephalic, atraumatic.   Ears:  external ears normal  Nose:  Septum intact  OC/OP:  Tonsils present. Floor of mouth, buccal mucosa, lips, hard palate, soft palate, uvula, posterior pharyngeal wall normal.  Mucosa moist.  Neck:  Trachea midline.  Thyroid, parotid and submandibular glands normal.  Lymph:  No cervical adenopathy.    MULTISYSTEM EXAM-  Neuro/Psych:  A&O x 3.  Mood stable.  Affect bright  Eyes:  EOMI, no nystagmus  Pulm:  No dyspnea, non-labored breathing  Cardiovascular: regular rate   Skin:  No rash or lesions on exposed skin of head/neck    Endoscopy findings:  Procedure: Flexible laryngoscopy    Pre-procedure diagnosis/Indication for procedure: To evaluate larynx    Description:    A flexible endoscope was used to examine the left and right nasal cavities, posterior oropharynx, hypopharynx, and larynx. The nasal valve areas were examined for abnormalities or collapse. The inferior and middle turbinates were evaluated. The middle and superior meatuses, the sphenoethmoid recesses, and the nasopharynx were examined and inspected for mucopurulence, polyps, and nasal masses. The oropharynx, tongue base/vallecula, epiglottis, aryepiglottic folds, arytenoids, vocal cords, hypopharynx were also inspected for swelling, inflammation, or dysfunction. The patient tolerated the procedure without complications.    Nasopharynx wnl  BOT/vallecula normal. Medium fishbone visualized in L lingual tonsillar tissue  Epiglottis sharp  AE folds nonedematous  Arytenoids mobile  Vocal folds mobile bilaterally  No masses or lesions visualized in post cricoid space or pyriform sinuses bilaterally  No active bleeding or significant obstruction noted in supraglottic area.

## 2023-07-01 NOTE — CONSULT NOTE ADULT - ASSESSMENT
63y Male presenting to [facility] ED with fishbone, FOE visualized in L lingual tonsil / BOT, successfully removed ~5cm fishbone in ED with direct flexible laryngoscopic visualization and use of a R angle clamp.    Plan:  - Discharge home   - NPO for the next hour after use of topical analgesics, may resume regular diet after  - Pain control prn  - If throat discomfort persists may follow up outpatient in ENT clinic    Plan discussed with attending.

## 2023-11-01 NOTE — ED ADULT NURSE NOTE - HIV OFFER
pt requesting non-renal carb consistent diet; indications for renal d/w pt, who adamantly requesting non renal diet, verbalizing awareness and understanding of r/c/a. Non-renal diabetic diet ordered accordingly      Dr. Chow informed of above Opt out

## 2024-04-30 ENCOUNTER — APPOINTMENT (OUTPATIENT)
Dept: OTOLARYNGOLOGY | Facility: CLINIC | Age: 64
End: 2024-04-30

## 2024-08-02 ENCOUNTER — APPOINTMENT (OUTPATIENT)
Dept: ORTHOPEDIC SURGERY | Facility: CLINIC | Age: 64
End: 2024-08-02

## 2024-12-09 NOTE — ED PROVIDER NOTE - NSTIMEPROVIDERCAREINITIATE_GEN_ER
Quitting Tobacco: Care Instructions  Quitting tobacco is much harder than simply changing a habit. Nicotine cravings make it hard to quit, but you can do it. Your doctor will help you set up the plan that best meets your needs.    You will miss the nicotine at first. You may feel short-tempered and grumpy. You may have trouble sleeping or thinking clearly. The urge to use tobacco may continue for a time.   Combining tools can raise your chances of success. You can use medicine along with counseling. And you can join a quit-tobacco program, such as the [employer]'s Freedom from Smoking program.     Get support.  Reach out to family and friends, and find a counselor to help you quit. Join a support group, such as Nicotine Anonymous. Go to www.smokefree.gov to sign up for text messaging support.     Talk to your doctor or pharmacist about medicines that can help you quit.  Medicines can help with cravings and withdrawal symptoms. There are several over-the-counter choices, such as nicotine patches, gum, and lozenges.     After you quit, do not use tobacco again, not even once.  Get rid of all tobacco products and anything that reminds you of tobacco, such as ashtrays.     Avoid things that make you reach for tobacco.  Change your daily routine. Take a different route to work, or eat a meal in a different place.     Try to cut down on stress.  Find ways to calm yourself, such as taking a hot bath or doing deep breathing exercises.     Eat a healthy diet, and get regular exercise.  Having healthy habits may help you quit using tobacco.     Don't give up on quitting if you use tobacco again.  Most people quit and restart a few times before they quit for good.   Follow-up care is a key part of your treatment and safety. Be sure to make and go to all appointments, and call your doctor if you are having problems. It's also a good idea to know your test results and keep a list of the medicines you  29-Jun-2023 21:11